# Patient Record
Sex: MALE | Race: WHITE | NOT HISPANIC OR LATINO
[De-identification: names, ages, dates, MRNs, and addresses within clinical notes are randomized per-mention and may not be internally consistent; named-entity substitution may affect disease eponyms.]

---

## 2018-10-12 ENCOUNTER — TRANSCRIPTION ENCOUNTER (OUTPATIENT)
Age: 58
End: 2018-10-12

## 2018-11-02 ENCOUNTER — TRANSCRIPTION ENCOUNTER (OUTPATIENT)
Age: 58
End: 2018-11-02

## 2018-11-07 ENCOUNTER — TRANSCRIPTION ENCOUNTER (OUTPATIENT)
Age: 58
End: 2018-11-07

## 2019-03-29 ENCOUNTER — TRANSCRIPTION ENCOUNTER (OUTPATIENT)
Age: 59
End: 2019-03-29

## 2019-10-31 ENCOUNTER — TRANSCRIPTION ENCOUNTER (OUTPATIENT)
Age: 59
End: 2019-10-31

## 2019-10-31 ENCOUNTER — INPATIENT (INPATIENT)
Facility: HOSPITAL | Age: 59
LOS: 0 days | Discharge: HOME | DRG: 247 | End: 2019-11-01
Attending: INTERNAL MEDICINE | Admitting: INTERNAL MEDICINE
Payer: COMMERCIAL

## 2019-10-31 VITALS
HEART RATE: 82 BPM | SYSTOLIC BLOOD PRESSURE: 119 MMHG | DIASTOLIC BLOOD PRESSURE: 65 MMHG | HEIGHT: 70 IN | WEIGHT: 279.99 LBS | TEMPERATURE: 99 F | OXYGEN SATURATION: 99 % | RESPIRATION RATE: 16 BRPM

## 2019-10-31 DIAGNOSIS — Z90.49 ACQUIRED ABSENCE OF OTHER SPECIFIED PARTS OF DIGESTIVE TRACT: Chronic | ICD-10-CM

## 2019-10-31 DIAGNOSIS — Z98.890 OTHER SPECIFIED POSTPROCEDURAL STATES: Chronic | ICD-10-CM

## 2019-10-31 DIAGNOSIS — I20.0 UNSTABLE ANGINA: ICD-10-CM

## 2019-10-31 DIAGNOSIS — Z95.1 PRESENCE OF AORTOCORONARY BYPASS GRAFT: Chronic | ICD-10-CM

## 2019-10-31 LAB
ALBUMIN SERPL ELPH-MCNC: 4.9 G/DL — SIGNIFICANT CHANGE UP (ref 3.3–5)
ALP SERPL-CCNC: 103 U/L — SIGNIFICANT CHANGE UP (ref 40–120)
ALT FLD-CCNC: 40 U/L — SIGNIFICANT CHANGE UP (ref 10–45)
ANION GAP SERPL CALC-SCNC: 20 MMOL/L — HIGH (ref 5–17)
AST SERPL-CCNC: 25 U/L — SIGNIFICANT CHANGE UP (ref 10–40)
BILIRUB SERPL-MCNC: 0.6 MG/DL — SIGNIFICANT CHANGE UP (ref 0.2–1.2)
BUN SERPL-MCNC: 20 MG/DL — SIGNIFICANT CHANGE UP (ref 7–23)
CALCIUM SERPL-MCNC: 9.7 MG/DL — SIGNIFICANT CHANGE UP (ref 8.4–10.5)
CHLORIDE SERPL-SCNC: 92 MMOL/L — LOW (ref 96–108)
CO2 SERPL-SCNC: 28 MMOL/L — SIGNIFICANT CHANGE UP (ref 22–31)
CREAT SERPL-MCNC: 1.22 MG/DL — SIGNIFICANT CHANGE UP (ref 0.5–1.3)
GLUCOSE SERPL-MCNC: 132 MG/DL — HIGH (ref 70–99)
HCT VFR BLD CALC: 44.1 % — SIGNIFICANT CHANGE UP (ref 39–50)
HGB BLD-MCNC: 15.6 G/DL — SIGNIFICANT CHANGE UP (ref 13–17)
MCHC RBC-ENTMCNC: 32.6 PG — SIGNIFICANT CHANGE UP (ref 27–34)
MCHC RBC-ENTMCNC: 35.4 GM/DL — SIGNIFICANT CHANGE UP (ref 32–36)
MCV RBC AUTO: 92.1 FL — SIGNIFICANT CHANGE UP (ref 80–100)
NRBC # BLD: 0 /100 WBCS — SIGNIFICANT CHANGE UP (ref 0–0)
PLATELET # BLD AUTO: 215 K/UL — SIGNIFICANT CHANGE UP (ref 150–400)
POTASSIUM SERPL-MCNC: 3.7 MMOL/L — SIGNIFICANT CHANGE UP (ref 3.5–5.3)
POTASSIUM SERPL-SCNC: 3.7 MMOL/L — SIGNIFICANT CHANGE UP (ref 3.5–5.3)
PROT SERPL-MCNC: 7.9 G/DL — SIGNIFICANT CHANGE UP (ref 6–8.3)
RBC # BLD: 4.79 M/UL — SIGNIFICANT CHANGE UP (ref 4.2–5.8)
RBC # FLD: 11.7 % — SIGNIFICANT CHANGE UP (ref 10.3–14.5)
SODIUM SERPL-SCNC: 140 MMOL/L — SIGNIFICANT CHANGE UP (ref 135–145)
WBC # BLD: 5.89 K/UL — SIGNIFICANT CHANGE UP (ref 3.8–10.5)
WBC # FLD AUTO: 5.89 K/UL — SIGNIFICANT CHANGE UP (ref 3.8–10.5)

## 2019-10-31 PROCEDURE — 99152 MOD SED SAME PHYS/QHP 5/>YRS: CPT | Mod: GC

## 2019-10-31 PROCEDURE — 93010 ELECTROCARDIOGRAM REPORT: CPT

## 2019-10-31 PROCEDURE — 92928 PRQ TCAT PLMT NTRAC ST 1 LES: CPT | Mod: RC,GC

## 2019-10-31 PROCEDURE — 93459 L HRT ART/GRFT ANGIO: CPT | Mod: 26,59,GC

## 2019-10-31 RX ORDER — VENLAFAXINE HCL 75 MG
37.5 CAPSULE, EXT RELEASE 24 HR ORAL DAILY
Refills: 0 | Status: DISCONTINUED | OUTPATIENT
Start: 2019-10-31 | End: 2019-11-01

## 2019-10-31 RX ORDER — MULTIVIT-MIN/FERROUS GLUCONATE 9 MG/15 ML
1 LIQUID (ML) ORAL DAILY
Refills: 0 | Status: DISCONTINUED | OUTPATIENT
Start: 2019-10-31 | End: 2019-11-01

## 2019-10-31 RX ORDER — CLOPIDOGREL BISULFATE 75 MG/1
1 TABLET, FILM COATED ORAL
Qty: 30 | Refills: 11
Start: 2019-10-31 | End: 2020-10-24

## 2019-10-31 RX ORDER — METOPROLOL TARTRATE 50 MG
100 TABLET ORAL DAILY
Refills: 0 | Status: DISCONTINUED | OUTPATIENT
Start: 2019-10-31 | End: 2019-11-01

## 2019-10-31 RX ORDER — ZOLPIDEM TARTRATE 10 MG/1
1 TABLET ORAL
Qty: 0 | Refills: 0 | DISCHARGE

## 2019-10-31 RX ORDER — LOSARTAN POTASSIUM 100 MG/1
100 TABLET, FILM COATED ORAL DAILY
Refills: 0 | Status: DISCONTINUED | OUTPATIENT
Start: 2019-10-31 | End: 2019-11-01

## 2019-10-31 RX ORDER — ATORVASTATIN CALCIUM 80 MG/1
20 TABLET, FILM COATED ORAL AT BEDTIME
Refills: 0 | Status: DISCONTINUED | OUTPATIENT
Start: 2019-10-31 | End: 2019-11-01

## 2019-10-31 RX ORDER — ASPIRIN/CALCIUM CARB/MAGNESIUM 324 MG
81 TABLET ORAL DAILY
Refills: 0 | Status: DISCONTINUED | OUTPATIENT
Start: 2019-10-31 | End: 2019-11-01

## 2019-10-31 RX ORDER — CLOPIDOGREL BISULFATE 75 MG/1
75 TABLET, FILM COATED ORAL DAILY
Refills: 0 | Status: DISCONTINUED | OUTPATIENT
Start: 2019-10-31 | End: 2019-11-01

## 2019-10-31 RX ORDER — ZALEPLON 10 MG
5 CAPSULE ORAL AT BEDTIME
Refills: 0 | Status: DISCONTINUED | OUTPATIENT
Start: 2019-10-31 | End: 2019-11-01

## 2019-10-31 RX ORDER — CLOPIDOGREL BISULFATE 75 MG/1
1 TABLET, FILM COATED ORAL
Qty: 0 | Refills: 0 | DISCHARGE

## 2019-10-31 RX ORDER — ASPIRIN/CALCIUM CARB/MAGNESIUM 324 MG
1 TABLET ORAL
Qty: 0 | Refills: 0 | DISCHARGE

## 2019-10-31 RX ORDER — SODIUM CHLORIDE 9 MG/ML
3 INJECTION INTRAMUSCULAR; INTRAVENOUS; SUBCUTANEOUS EVERY 8 HOURS
Refills: 0 | Status: DISCONTINUED | OUTPATIENT
Start: 2019-10-31 | End: 2019-11-01

## 2019-10-31 RX ORDER — LEVOTHYROXINE SODIUM 125 MCG
175 TABLET ORAL DAILY
Refills: 0 | Status: DISCONTINUED | OUTPATIENT
Start: 2019-10-31 | End: 2019-11-01

## 2019-10-31 RX ORDER — METOPROLOL TARTRATE 50 MG
1 TABLET ORAL
Qty: 0 | Refills: 0 | DISCHARGE

## 2019-10-31 RX ORDER — ASPIRIN/CALCIUM CARB/MAGNESIUM 324 MG
325 TABLET ORAL DAILY
Refills: 0 | Status: DISCONTINUED | OUTPATIENT
Start: 2019-10-31 | End: 2019-10-31

## 2019-10-31 RX ORDER — ATORVASTATIN CALCIUM 80 MG/1
1 TABLET, FILM COATED ORAL
Qty: 0 | Refills: 0 | DISCHARGE

## 2019-10-31 RX ORDER — LOSARTAN/HYDROCHLOROTHIAZIDE 100MG-25MG
1 TABLET ORAL
Qty: 0 | Refills: 0 | DISCHARGE

## 2019-10-31 RX ORDER — HYDROCHLOROTHIAZIDE 25 MG
12.5 TABLET ORAL DAILY
Refills: 0 | Status: DISCONTINUED | OUTPATIENT
Start: 2019-10-31 | End: 2019-11-01

## 2019-10-31 RX ORDER — ASPIRIN/CALCIUM CARB/MAGNESIUM 324 MG
1 TABLET ORAL
Qty: 30 | Refills: 11
Start: 2019-10-31 | End: 2020-10-24

## 2019-10-31 RX ORDER — MULTIVIT-MIN/FERROUS GLUCONATE 9 MG/15 ML
1 LIQUID (ML) ORAL
Qty: 0 | Refills: 0 | DISCHARGE

## 2019-10-31 RX ORDER — LEVOTHYROXINE SODIUM 125 MCG
1 TABLET ORAL
Qty: 0 | Refills: 0 | DISCHARGE

## 2019-10-31 RX ORDER — VENLAFAXINE HCL 75 MG
1 CAPSULE, EXT RELEASE 24 HR ORAL
Qty: 0 | Refills: 0 | DISCHARGE

## 2019-10-31 RX ADMIN — ATORVASTATIN CALCIUM 20 MILLIGRAM(S): 80 TABLET, FILM COATED ORAL at 21:22

## 2019-10-31 RX ADMIN — SODIUM CHLORIDE 3 MILLILITER(S): 9 INJECTION INTRAMUSCULAR; INTRAVENOUS; SUBCUTANEOUS at 17:23

## 2019-10-31 RX ADMIN — SODIUM CHLORIDE 3 MILLILITER(S): 9 INJECTION INTRAMUSCULAR; INTRAVENOUS; SUBCUTANEOUS at 21:18

## 2019-10-31 RX ADMIN — Medication 100 MILLIGRAM(S): at 21:22

## 2019-10-31 RX ADMIN — Medication 5 MILLIGRAM(S): at 21:22

## 2019-10-31 NOTE — DISCHARGE NOTE PROVIDER - NSDCMRMEDTOKEN_GEN_ALL_CORE_FT
Ambien 10 mg oral tablet: 1 tab(s) orally once a day (at bedtime)  Centrum Adults oral tablet: 1 tab(s) orally once a day  Ecotrin 325 mg oral delayed release tablet: 1 tab(s) orally once a day  Effexor 37.5 mg oral tablet: 1 tab(s) orally once a day  hydroCHLOROthiazide 12.5 mg oral capsule: 1 cap(s) orally once a day  Lipitor 20 mg oral tablet: 1 tab(s) orally once a day  losartan-hydrochlorothiazide 100mg-12.5mg oral tablet: 1 tab(s) orally once a day  Plavix 75 mg oral tablet: 1 tab(s) orally once a day  Synthroid 175 mcg (0.175 mg) oral tablet: 1 tab(s) orally once a day  Toprol- mg oral tablet, extended release: 1 tab(s) orally 2 times a day Ambien 10 mg oral tablet: 1 tab(s) orally once a day (at bedtime)  aspirin 81 mg oral delayed release tablet: 1 tab(s) orally once a day  Centrum Adults oral tablet: 1 tab(s) orally once a day  Effexor 37.5 mg oral tablet: 1 tab(s) orally once a day  Lipitor 20 mg oral tablet: 1 tab(s) orally once a day  losartan-hydrochlorothiazide 100mg-12.5mg oral tablet: 1 tab(s) orally once a day  Plavix 75 mg oral tablet: 1 tab(s) orally once a day  Synthroid 175 mcg (0.175 mg) oral tablet: 1 tab(s) orally once a day  Toprol- mg oral tablet, extended release: 1 tab(s) orally 2 times a day

## 2019-10-31 NOTE — CHART NOTE - NSCHARTNOTEFT_GEN_A_CORE
Removal of Femoral Sheath    Pulses in the right lower extremity are (palpable/audible by doppler/absent). The patient was placed in the supine position. The insertion site was identified and the sutures were removed per protocol.  The 6_ Setswana femoral sheath was then removed. Direct pressure was applied for 20_ minutes.     Monitoring of the right groin and both lower extremities including neuro-vascular checks and vital signs every 15 minutes x 4, then every 30 minutes x 2, then every 1 hour was ordered.    Complications: None    Comments:

## 2019-10-31 NOTE — H&P CARDIOLOGY - PSH
H/O carotid endarterectomy    History of cholecystectomy    S/P CABG (coronary artery bypass graft)  at age of 36

## 2019-10-31 NOTE — H&P CARDIOLOGY - HISTORY OF PRESENT ILLNESS
60 yo male no implantable device with PMHX of CAD, CABG (at age of 36), family history of MI (father  @ 48), HTN, HLD, morbid obesity (BMI>40), carotid endarterectomy, EMMY on CPAP, hypothyroidism, depression who presents for cardiac cath. Pt reports he has been feeling intermittent chest tightness with SOB worse on exertion over a months. Pt was evaluated by Dr. Smith and referred for cath after having abnormal stress test. Pt denies chest pain, SOB, dizziness or palpitation currently.   Stress test 10/25/2019: EF 62%, Moderate to severely abnormal study with medium sized inferior and poterolateral ischemia without infarct.   PCP: Julio Calloway  Cardiologist: Harsh Smith

## 2019-10-31 NOTE — H&P CARDIOLOGY - PMH
CAD (coronary artery disease)    Carotid stenosis, bilateral    Depression    HLD (hyperlipidemia)    HTN (hypertension)    Hypothyroidism    Insomnia    Obesity, Class III, BMI 40-49.9 (morbid obesity)    EMMY on CPAP

## 2019-10-31 NOTE — DISCHARGE NOTE PROVIDER - HOSPITAL COURSE
58 yo male no implantable device with PMHX of CAD, CABG (at age of 36), family history of MI (father  @ 48), HTN, HLD, morbid obesity (BMI>40), carotid endarterectomy, EMMY on CPAP, hypothyroidism, depression who presents for cardiac cath. Pt reports he has been feeling intermittent chest tightness with SOB worse on exertion over a months. Pt was evaluated by Dr. Smith and referred for cath after having abnormal stress test. Pt denies chest pain, SOB, dizziness or palpitation currently.     Stress test 10/25/2019: EF 62%, Moderate to severely abnormal study with medium sized inferior and poterolateral ischemia without infarct.     PCP: Julio Calloway    Cardiologist: Harsh Smith (31 Oct 2019 06:50)            10/31/2019: ostial RCA 80 RAYMUNDO x 1 ; MRCA 90% synergy x 2. LIMA  to LAD patent, SVG to OM down         Do not stop you Aspirin or Plavix unless instructed to do so by your cardiologist. 60 yo male no implantable device with PMHX of CAD, CABG (at age of 36), family history of MI (father  @ 48), HTN, HLD, morbid obesity (BMI>40), carotid endarterectomy, EMMY on CPAP, hypothyroidism, depression who presents for cardiac cath. Pt reports he has been feeling intermittent chest tightness with SOB worse on exertion over a months. Pt was evaluated by Dr. Smith and referred for cath after having abnormal stress test. Pt denies chest pain, SOB, dizziness or palpitation currently.     Stress test 10/25/2019: EF 62%, Moderate to severely abnormal study with medium sized inferior and poterolateral ischemia without infarct.     PCP: Julio Calloway    Cardiologist: Harsh Smith (31 Oct 2019 06:50)            10/31/2019: LHC via RFA ostial RCA 80 RAYMUNDO x 1 ; MRCA 90% synergy x 2. LIMA  to LAD patent, SVG to OM occluded         Do not stop you Aspirin or Plavix unless instructed to do so by your cardiologist.

## 2019-10-31 NOTE — DISCHARGE NOTE PROVIDER - NSDCCPTREATMENT_GEN_ALL_CORE_FT
PRINCIPAL PROCEDURE  Procedure: Left heart catheterization  Findings and Treatment: 10/31/2019: ostial RCA 80 RAYMUNDO x 1 ; MRCA 90% synergy x 2. LIMA  to LAD patent, SVG to OM down PRINCIPAL PROCEDURE  Procedure: Left heart catheterization  Findings and Treatment: 10/31/2019: ostial RCA 80 RAYMUNDO x 1 ; MRCA 90% synergy x 2. LIMA  to LAD patent, SVG to OM occluded

## 2019-10-31 NOTE — DISCHARGE NOTE PROVIDER - NSDCCPCAREPLAN_GEN_ALL_CORE_FT
PRINCIPAL DISCHARGE DIAGNOSIS  Diagnosis: CAD (coronary artery disease)  Assessment and Plan of Treatment: Pt remains chest pain free and understands post cath discharge instructions   No heavy lifting, strenuous activity, bending, straining or unnecessary stair climbing  for 2 weeks. No sex for 1 week.  No driving for 2 days. You may shower 24 hours following procedure but avoid baths and swimming for 1 week. Check groin site for bleeding and/or swelling daily following procedure. Call your doctor/cardiologist immediately should it occur or if you have increased/persistent pain at the site. Follow up with your cardiologist in 1- 2 weeks. You may call [facility] at [phone] or [phone] after office hours and weekends  with any questions or concerns following your procedure. Take medications as prescribed.      SECONDARY DISCHARGE DIAGNOSES  Diagnosis: HTN (hypertension)  Assessment and Plan of Treatment: Your blood pressure will be controlled.   Continue with your blood pressure medications; eat a heart healthy diet with low salt diet; exercise regularly (consult with your physician or cardiologist first); maintain a heart healthy weight; if you smoke - quit (A resource to help you stop smoking is the [facility] [employer] – phone number [phone].); include healthy ways to manage stress. Continue to follow with your primary care physician or cardiologist.    Diagnosis: HLD (hyperlipidemia)  Assessment and Plan of Treatment: Your LDL cholesterol will be less than 70mg/dL   Continue with your cholesterol medications. Eat a heart healthy diet that is low in saturated fats and salt, and includes whole grains, fruits, vegetables and lean protein; exercise regularly (consult with your physician or cardiologist first); maintain a heart healthy weight. Continue to follow with your primary physician or cardiologist for treatment goals, continue medication, have liver function testing every 3 months as anti lipid medications can cause liver irritation. If you smoke - quit (A resource to help you stop smoking is the [facility] [employer] – phone number [phone].).

## 2019-10-31 NOTE — CHART NOTE - NSCHARTNOTEFT_GEN_A_CORE
HPI:  60 yo male no implantable device with PMHX of CAD, CABG (at age of 36), family history of MI (father  @ 48), HTN, HLD, morbid obesity (BMI>40), carotid endarterectomy, EMMY on CPAP, hypothyroidism, depression who presents for cardiac cath. Pt reports he has been feeling intermittent chest tightness with SOB worse on exertion over a months. Pt was evaluated by Dr. Smith and referred for cath after having abnormal stress test. Pt denies chest pain, SOB, dizziness or palpitation currently.   Stress test 10/25/2019: EF 62%, Moderate to severely abnormal study with medium sized inferior and poterolateral ischemia without infarct.   PCP: Julio Calloway  Cardiologist: Harsh Smith (31 Oct 2019 06:50)Pt. s/p stent x 3 ostial/mid RCA    Patient underwent a PCI procedure and is being admitted as they are at increased risk for major adverse cardiac and vascular events if discharged due to the following high risk characteristics: bifurcation lesion >28mm, significant HTN during procedure.

## 2019-10-31 NOTE — H&P CARDIOLOGY - FAMILY HISTORY
Father  Still living? No  Family history of heart attack, Age at diagnosis: 41-50     Mother  Still living? No  Family history of breast cancer, Age at diagnosis: Age Unknown  Family history of diabetes mellitus, Age at diagnosis: Age Unknown

## 2019-10-31 NOTE — DISCHARGE NOTE PROVIDER - CARE PROVIDER_API CALL
Harsh Smith (DO)  Cardiology; Internal Medicine; Nuclear Cardiology  [facility], [address] [place] [address]  Phone: [phone]  Fax: [phone]  Follow Up Time:

## 2019-11-01 ENCOUNTER — TRANSCRIPTION ENCOUNTER (OUTPATIENT)
Age: 59
End: 2019-11-01

## 2019-11-01 VITALS
HEART RATE: 73 BPM | TEMPERATURE: 99 F | DIASTOLIC BLOOD PRESSURE: 58 MMHG | OXYGEN SATURATION: 98 % | SYSTOLIC BLOOD PRESSURE: 101 MMHG | RESPIRATION RATE: 18 BRPM

## 2019-11-01 LAB
ALBUMIN SERPL ELPH-MCNC: 4.1 G/DL — SIGNIFICANT CHANGE UP (ref 3.3–5)
ALP SERPL-CCNC: 66 U/L — SIGNIFICANT CHANGE UP (ref 40–120)
ALT FLD-CCNC: 39 U/L — SIGNIFICANT CHANGE UP (ref 10–45)
ANION GAP SERPL CALC-SCNC: 10 MMOL/L — SIGNIFICANT CHANGE UP (ref 5–17)
AST SERPL-CCNC: 30 U/L — SIGNIFICANT CHANGE UP (ref 10–40)
BASOPHILS # BLD AUTO: 0.05 K/UL — SIGNIFICANT CHANGE UP (ref 0–0.2)
BASOPHILS NFR BLD AUTO: 0.5 % — SIGNIFICANT CHANGE UP (ref 0–2)
BILIRUB SERPL-MCNC: 0.5 MG/DL — SIGNIFICANT CHANGE UP (ref 0.2–1.2)
BUN SERPL-MCNC: 19 MG/DL — SIGNIFICANT CHANGE UP (ref 7–23)
CALCIUM SERPL-MCNC: 9.1 MG/DL — SIGNIFICANT CHANGE UP (ref 8.4–10.5)
CHLORIDE SERPL-SCNC: 100 MMOL/L — SIGNIFICANT CHANGE UP (ref 96–108)
CO2 SERPL-SCNC: 29 MMOL/L — SIGNIFICANT CHANGE UP (ref 22–31)
CREAT SERPL-MCNC: 1.2 MG/DL — SIGNIFICANT CHANGE UP (ref 0.5–1.3)
EOSINOPHIL # BLD AUTO: 0.19 K/UL — SIGNIFICANT CHANGE UP (ref 0–0.5)
EOSINOPHIL NFR BLD AUTO: 1.9 % — SIGNIFICANT CHANGE UP (ref 0–6)
GLUCOSE SERPL-MCNC: 122 MG/DL — HIGH (ref 70–99)
HCT VFR BLD CALC: 40.1 % — SIGNIFICANT CHANGE UP (ref 39–50)
HGB BLD-MCNC: 14.1 G/DL — SIGNIFICANT CHANGE UP (ref 13–17)
IMM GRANULOCYTES NFR BLD AUTO: 0.5 % — SIGNIFICANT CHANGE UP (ref 0–1.5)
LYMPHOCYTES # BLD AUTO: 1.48 K/UL — SIGNIFICANT CHANGE UP (ref 1–3.3)
LYMPHOCYTES # BLD AUTO: 14.9 % — SIGNIFICANT CHANGE UP (ref 13–44)
MCHC RBC-ENTMCNC: 32.6 PG — SIGNIFICANT CHANGE UP (ref 27–34)
MCHC RBC-ENTMCNC: 35.2 GM/DL — SIGNIFICANT CHANGE UP (ref 32–36)
MCV RBC AUTO: 92.8 FL — SIGNIFICANT CHANGE UP (ref 80–100)
MONOCYTES # BLD AUTO: 0.91 K/UL — HIGH (ref 0–0.9)
MONOCYTES NFR BLD AUTO: 9.2 % — SIGNIFICANT CHANGE UP (ref 2–14)
NEUTROPHILS # BLD AUTO: 7.22 K/UL — SIGNIFICANT CHANGE UP (ref 1.8–7.4)
NEUTROPHILS NFR BLD AUTO: 73 % — SIGNIFICANT CHANGE UP (ref 43–77)
NRBC # BLD: 0 /100 WBCS — SIGNIFICANT CHANGE UP (ref 0–0)
PLATELET # BLD AUTO: 181 K/UL — SIGNIFICANT CHANGE UP (ref 150–400)
POTASSIUM SERPL-MCNC: 4.3 MMOL/L — SIGNIFICANT CHANGE UP (ref 3.5–5.3)
POTASSIUM SERPL-SCNC: 4.3 MMOL/L — SIGNIFICANT CHANGE UP (ref 3.5–5.3)
PROT SERPL-MCNC: 7 G/DL — SIGNIFICANT CHANGE UP (ref 6–8.3)
RBC # BLD: 4.32 M/UL — SIGNIFICANT CHANGE UP (ref 4.2–5.8)
RBC # FLD: 11.8 % — SIGNIFICANT CHANGE UP (ref 10.3–14.5)
SODIUM SERPL-SCNC: 139 MMOL/L — SIGNIFICANT CHANGE UP (ref 135–145)
WBC # BLD: 9.9 K/UL — SIGNIFICANT CHANGE UP (ref 3.8–10.5)
WBC # FLD AUTO: 9.9 K/UL — SIGNIFICANT CHANGE UP (ref 3.8–10.5)

## 2019-11-01 PROCEDURE — C1725: CPT

## 2019-11-01 PROCEDURE — C1887: CPT

## 2019-11-01 PROCEDURE — 99153 MOD SED SAME PHYS/QHP EA: CPT

## 2019-11-01 PROCEDURE — 80053 COMPREHEN METABOLIC PANEL: CPT

## 2019-11-01 PROCEDURE — 85027 COMPLETE CBC AUTOMATED: CPT

## 2019-11-01 PROCEDURE — 93459 L HRT ART/GRFT ANGIO: CPT | Mod: 59

## 2019-11-01 PROCEDURE — C1874: CPT

## 2019-11-01 PROCEDURE — 93005 ELECTROCARDIOGRAM TRACING: CPT

## 2019-11-01 PROCEDURE — 94660 CPAP INITIATION&MGMT: CPT

## 2019-11-01 PROCEDURE — C1769: CPT

## 2019-11-01 PROCEDURE — C1894: CPT

## 2019-11-01 PROCEDURE — 99152 MOD SED SAME PHYS/QHP 5/>YRS: CPT

## 2019-11-01 PROCEDURE — C9600: CPT | Mod: RC

## 2019-11-01 RX ORDER — METOPROLOL TARTRATE 50 MG
100 TABLET ORAL ONCE
Refills: 0 | Status: COMPLETED | OUTPATIENT
Start: 2019-11-01 | End: 2019-11-01

## 2019-11-01 RX ORDER — ACETAMINOPHEN 500 MG
650 TABLET ORAL ONCE
Refills: 0 | Status: COMPLETED | OUTPATIENT
Start: 2019-11-01 | End: 2019-11-01

## 2019-11-01 RX ADMIN — LOSARTAN POTASSIUM 100 MILLIGRAM(S): 100 TABLET, FILM COATED ORAL at 05:47

## 2019-11-01 RX ADMIN — Medication 12.5 MILLIGRAM(S): at 05:47

## 2019-11-01 RX ADMIN — Medication 81 MILLIGRAM(S): at 05:47

## 2019-11-01 RX ADMIN — Medication 37.5 MILLIGRAM(S): at 05:47

## 2019-11-01 RX ADMIN — CLOPIDOGREL BISULFATE 75 MILLIGRAM(S): 75 TABLET, FILM COATED ORAL at 05:47

## 2019-11-01 RX ADMIN — Medication 650 MILLIGRAM(S): at 06:02

## 2019-11-01 RX ADMIN — SODIUM CHLORIDE 3 MILLILITER(S): 9 INJECTION INTRAMUSCULAR; INTRAVENOUS; SUBCUTANEOUS at 05:46

## 2019-11-01 RX ADMIN — Medication 175 MICROGRAM(S): at 05:47

## 2019-11-01 RX ADMIN — Medication 650 MILLIGRAM(S): at 06:51

## 2019-11-01 RX ADMIN — Medication 100 MILLIGRAM(S): at 09:03

## 2019-11-01 NOTE — PROGRESS NOTE ADULT - ASSESSMENT
Patient is a 59y old  Male who presents with a chief complaint of Positive stress test (31 Oct 2019 20:49) now s/p C RAYMUNDO x 1 ostial RCA and RAYMUNDO x 2 mRCA via RFA access. Pt tolerated the procedure well, site benign. Post-procedure discharge instructions discussed and questions addressed

## 2019-11-01 NOTE — PROGRESS NOTE ADULT - SUBJECTIVE AND OBJECTIVE BOX
Patient is a 59y old  Male who presents with a chief complaint of Positive stress test (31 Oct 2019 20:49) now s/p LHC RAYMUNDO x 1 ostial RCA and RAYMUNDO x 2 mRCA via RFA access           Allergies    Levaquin (Anaphylaxis)    Intolerances        Medications:  aspirin enteric coated 81 milliGRAM(s) Oral daily  atorvastatin 20 milliGRAM(s) Oral at bedtime  clopidogrel Tablet 75 milliGRAM(s) Oral daily  hydrochlorothiazide 12.5 milliGRAM(s) Oral daily  levothyroxine 175 MICROGram(s) Oral daily  losartan 100 milliGRAM(s) Oral daily  metoprolol succinate  milliGRAM(s) Oral daily  multivitamin/minerals 1 Tablet(s) Oral daily  sodium chloride 0.9% lock flush 3 milliLiter(s) IV Push every 8 hours  venlafaxine 37.5 milliGRAM(s) Oral daily  zaleplon 5 milliGRAM(s) Oral at bedtime PRN      Vitals:  T(C): 37.4 (19 @ 05:05), Max: 37.6 (10-31-19 @ 19:45)  HR: 73 (19 @ 05:05) (65 - 83)  BP: 101/58 (19 @ 05:05) (93/51 - 119/74)  BP(mean): 83 (10-31-19 @ 06:50) (83 - 83)  RR: 18 (19 @ 05:05) (16 - 18)  SpO2: 98% (19 @ 05:05) (96% - 100%)  Wt(kg): --  Daily Height in cm: 177.8 (31 Oct 2019 10:04)    Daily Weight in k (31 Oct 2019 06:50)  I&O's Summary    31 Oct 2019 07:01  -  2019 06:33  --------------------------------------------------------  IN: 600 mL / OUT: 1 mL / NET: 599 mL          Physical Exam:  Appearance: Normal  Procedural Access Site: RRA artery access . No hematoma, Non-tender to palpation, 2+ pulse, No bruit, No Ecchymosis  Respiratory: Clear to auscultation bilaterally  Gastrointestinal: Soft, Non tender, Normal Bowel Sounds  Musculoskeletal: No clubbing, No joint deformity   Neurologic: Non-focal  Lymphatic: No lymphadenopathy  Psychiatry: AAOx3, Mood & affect appropriate  Skin: No rashes, No ecchymoses, No cyanosis        139  |  100  |  19  ----------------------------<  122<H>  4.3   |  29  |  1.20    Ca    9.1      2019 02:42    TPro  7.0  /  Alb  4.1  /  TBili  0.5  /  DBili  x   /  AST  30  /  ALT  39  /  AlkPhos  66          Interpretation of Telemetry:

## 2019-11-01 NOTE — DISCHARGE NOTE NURSING/CASE MANAGEMENT/SOCIAL WORK - PATIENT PORTAL LINK FT
You can access the FollowMyHealth Patient Portal offered by [facility] by registering at the following website: [email or web address]. By joining [employer]s FollowMyHealth portal, you will also be able to view your health information using other applications (apps) compatible with our system.

## 2023-07-26 PROBLEM — Z00.00 ENCOUNTER FOR PREVENTIVE HEALTH EXAMINATION: Status: ACTIVE | Noted: 2023-07-26

## 2023-07-27 PROBLEM — E66.01 MORBID (SEVERE) OBESITY DUE TO EXCESS CALORIES: Chronic | Status: ACTIVE | Noted: 2019-10-31

## 2023-07-27 PROBLEM — I25.10 ATHEROSCLEROTIC HEART DISEASE OF NATIVE CORONARY ARTERY WITHOUT ANGINA PECTORIS: Chronic | Status: ACTIVE | Noted: 2019-10-31

## 2023-07-27 PROBLEM — G47.00 INSOMNIA, UNSPECIFIED: Chronic | Status: ACTIVE | Noted: 2019-10-31

## 2023-07-27 PROBLEM — G47.33 OBSTRUCTIVE SLEEP APNEA (ADULT) (PEDIATRIC): Chronic | Status: ACTIVE | Noted: 2019-10-31

## 2023-07-27 PROBLEM — E03.9 HYPOTHYROIDISM, UNSPECIFIED: Chronic | Status: ACTIVE | Noted: 2019-10-31

## 2023-07-27 PROBLEM — I65.23 OCCLUSION AND STENOSIS OF BILATERAL CAROTID ARTERIES: Chronic | Status: ACTIVE | Noted: 2019-10-31

## 2023-07-27 PROBLEM — I10 ESSENTIAL (PRIMARY) HYPERTENSION: Chronic | Status: ACTIVE | Noted: 2019-10-31

## 2023-07-27 PROBLEM — E78.5 HYPERLIPIDEMIA, UNSPECIFIED: Chronic | Status: ACTIVE | Noted: 2019-10-31

## 2023-07-27 PROBLEM — F32.9 MAJOR DEPRESSIVE DISORDER, SINGLE EPISODE, UNSPECIFIED: Chronic | Status: ACTIVE | Noted: 2019-10-31

## 2023-08-02 ENCOUNTER — APPOINTMENT (OUTPATIENT)
Dept: ELECTROPHYSIOLOGY | Facility: CLINIC | Age: 63
End: 2023-08-02
Payer: COMMERCIAL

## 2023-08-02 VITALS
BODY MASS INDEX: 38.92 KG/M2 | HEIGHT: 71 IN | DIASTOLIC BLOOD PRESSURE: 68 MMHG | WEIGHT: 278 LBS | HEART RATE: 54 BPM | SYSTOLIC BLOOD PRESSURE: 116 MMHG

## 2023-08-02 PROCEDURE — 93000 ELECTROCARDIOGRAM COMPLETE: CPT

## 2023-08-02 PROCEDURE — 99204 OFFICE O/P NEW MOD 45 MIN: CPT | Mod: 25

## 2023-08-02 NOTE — CARDIOLOGY SUMMARY
[de-identified] : 8/2/23 sinus bradycardia 54 bpm, narrow QRS [de-identified] : TTE 5/22/23 TTE 59%, LA 4.5cm, RVSP <25, no significant valvular disease    [de-identified] :  Cath 3/30/23 severe native vessel CAD. Patent CORDON to LAD, which fills the LCx retrograde, patent RCA stent.
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
## 2023-08-02 NOTE — REVIEW OF SYSTEMS
[SOB] : no shortness of breath [Dyspnea on exertion] : not dyspnea during exertion [Chest Discomfort] : no chest discomfort [Lower Ext Edema] : no extremity edema [Leg Claudication] : no intermittent leg claudication [Palpitations] : no palpitations [Syncope] : syncope [Dizziness] : no dizziness [Convulsions] : no convulsions [Easy Bleeding] : a tendency for easy bleeding [Easy Bruising] : no tendency for easy bruising [Negative] : Psychiatric

## 2023-08-02 NOTE — REASON FOR VISIT
[Arrhythmia/ECG Abnorrmalities] : arrhythmia/ECG abnormalities [Spouse] : spouse [FreeTextEntry3] : Dr Madison

## 2023-08-02 NOTE — PHYSICAL EXAM
[Well Developed] : well developed [Well Nourished] : well nourished [No Acute Distress] : no acute distress [Normal Conjunctiva] : normal conjunctiva [Normal Venous Pressure] : normal venous pressure [Normal S1, S2] : normal S1, S2 [No Murmur] : no murmur [Clear Lung Fields] : clear lung fields [Good Air Entry] : good air entry [No Respiratory Distress] : no respiratory distress  [Soft] : abdomen soft [Normal Gait] : normal gait [No Edema] : no edema [No Cyanosis] : no cyanosis [No Varicosities] : no varicosities [No Rash] : no rash [Moves all extremities] : moves all extremities [No Focal Deficits] : no focal deficits [Normal Speech] : normal speech [Alert and Oriented] : alert and oriented

## 2023-10-11 ENCOUNTER — APPOINTMENT (OUTPATIENT)
Dept: ELECTROPHYSIOLOGY | Facility: CLINIC | Age: 63
End: 2023-10-11
Payer: COMMERCIAL

## 2023-10-11 VITALS
WEIGHT: 278 LBS | HEIGHT: 71 IN | BODY MASS INDEX: 38.92 KG/M2 | HEART RATE: 55 BPM | DIASTOLIC BLOOD PRESSURE: 62 MMHG | SYSTOLIC BLOOD PRESSURE: 122 MMHG

## 2023-10-11 PROCEDURE — 99215 OFFICE O/P EST HI 40 MIN: CPT | Mod: 25

## 2023-10-11 PROCEDURE — 93000 ELECTROCARDIOGRAM COMPLETE: CPT

## 2024-03-29 NOTE — PHYSICAL EXAM
[Well Developed] : well developed [Well Nourished] : well nourished [Normal Conjunctiva] : normal conjunctiva [No Acute Distress] : no acute distress [Normal Venous Pressure] : normal venous pressure [No Murmur] : no murmur [Normal S1, S2] : normal S1, S2 [Clear Lung Fields] : clear lung fields [Good Air Entry] : good air entry [Soft] : abdomen soft [Normal Gait] : normal gait [No Respiratory Distress] : no respiratory distress  [No Edema] : no edema [No Cyanosis] : no cyanosis [No Rash] : no rash [No Varicosities] : no varicosities [Moves all extremities] : moves all extremities [No Focal Deficits] : no focal deficits [Alert and Oriented] : alert and oriented [Normal Speech] : normal speech

## 2024-04-03 ENCOUNTER — APPOINTMENT (OUTPATIENT)
Dept: ELECTROPHYSIOLOGY | Facility: CLINIC | Age: 64
End: 2024-04-03
Payer: COMMERCIAL

## 2024-04-03 VITALS
BODY MASS INDEX: 41.3 KG/M2 | WEIGHT: 295 LBS | HEART RATE: 67 BPM | DIASTOLIC BLOOD PRESSURE: 78 MMHG | HEIGHT: 71 IN | SYSTOLIC BLOOD PRESSURE: 126 MMHG

## 2024-04-03 PROCEDURE — 93000 ELECTROCARDIOGRAM COMPLETE: CPT

## 2024-04-03 PROCEDURE — 99215 OFFICE O/P EST HI 40 MIN: CPT | Mod: 25

## 2024-04-03 NOTE — DISCUSSION/SUMMARY
[FreeTextEntry1] : 64 year old gentleman with history of CAD s/p CABG 1996, PCI 2019, sleep apnea, obesity, hypothyroidism, HTN, and cardiac arrest s/p ICD 4/2023, paroxysmal AF (in the setting cardiac arrest) now presenting for EP followup.   He continues to do very well without recurrent arrhythmia since the event.   At last visits amiodarone and Eliquis were stopped, and there have been no recurrent arrhythmias. He remains on high dose beta blockade.   If the AF does recur in the future, would restart OAC/Eliquis- arrhythmia monitoring will be performed with his ICD, and I also previously recommended an Apple Watch for at home assessment.  He remains on DAPT with ASA 81mg qd + plavix, after stopping OAC. Would be reasonable to lower to single antiplatelet therapy- pt do discuss this with Dr Smith at next followup.   Will continue cardiology and EP followup, and ICD monitoring.   -Cardiology followup to determine duration of DAPT in the future -Continue Toprol 100mg bid.   -routine ICD monitoring  -EP and device followup in 12 months or prn [EKG obtained to assist in diagnosis and management of assessed problem(s)] : EKG obtained to assist in diagnosis and management of assessed problem(s)

## 2024-04-03 NOTE — REASON FOR VISIT
[Spouse] : spouse [Arrhythmia/ECG Abnorrmalities] : arrhythmia/ECG abnormalities [FreeTextEntry1] : incomplete note [FreeTextEntry3] : Dr Madison
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
## 2024-04-03 NOTE — CARDIOLOGY SUMMARY
[de-identified] :  1 week MCOT  9/6 to 9/13/23 revealed sinus rhythm (avg 68 bpm, range  bpm) with brief episodes of NSVT up to 5 beats. There was no AF.  [de-identified] : 4/3/24 sinus rhythm 67 bpm, narrow QRS, nonspecific anteroseptal Tw inversion 10/11/23 sinus bradycardia 55 bpm, narrow QRS 8/2/23 sinus bradycardia 54 bpm, narrow QRS [de-identified] : TTE 5/22/23 TTE 59%, LA 4.5cm, RVSP <25, no significant valvular disease    [de-identified] :  Cath 3/30/23 severe native vessel CAD. Patent CORDON to LAD, which fills the LCx retrograde, patent RCA stent.

## 2024-04-03 NOTE — REVIEW OF SYSTEMS
[Syncope] : syncope [Easy Bleeding] : a tendency for easy bleeding [Negative] : Integumentary [Dyspnea on exertion] : not dyspnea during exertion [SOB] : no shortness of breath [Chest Discomfort] : no chest discomfort [Lower Ext Edema] : no extremity edema [Palpitations] : no palpitations [Leg Claudication] : no intermittent leg claudication [Dizziness] : no dizziness [Convulsions] : no convulsions [Easy Bruising] : no tendency for easy bruising

## 2024-12-18 ENCOUNTER — NON-APPOINTMENT (OUTPATIENT)
Age: 64
End: 2024-12-18
